# Patient Record
Sex: MALE | Race: WHITE
[De-identification: names, ages, dates, MRNs, and addresses within clinical notes are randomized per-mention and may not be internally consistent; named-entity substitution may affect disease eponyms.]

---

## 2018-01-26 ENCOUNTER — HOSPITAL ENCOUNTER (OUTPATIENT)
Dept: HOSPITAL 17 - CPRE | Age: 58
End: 2018-01-26
Attending: ORTHOPAEDIC SURGERY
Payer: COMMERCIAL

## 2018-01-26 DIAGNOSIS — Z01.812: Primary | ICD-10-CM

## 2018-01-26 DIAGNOSIS — M17.12: ICD-10-CM

## 2018-01-26 DIAGNOSIS — M79.609: ICD-10-CM

## 2018-01-26 LAB
BUN SERPL-MCNC: 14 MG/DL (ref 7–18)
CALCIUM SERPL-MCNC: 8.9 MG/DL (ref 8.5–10.1)
CHLORIDE SERPL-SCNC: 101 MEQ/L (ref 98–107)
COLOR UR: YELLOW
CREAT SERPL-MCNC: 0.93 MG/DL (ref 0.6–1.3)
ERYTHROCYTE [DISTWIDTH] IN BLOOD BY AUTOMATED COUNT: 12.9 % (ref 11.6–17.2)
GFR SERPLBLD BASED ON 1.73 SQ M-ARVRAT: 84 ML/MIN (ref 89–?)
GLUCOSE UR STRIP-MCNC: (no result) MG/DL
HCO3 BLD-SCNC: 28.1 MEQ/L (ref 21–32)
HCT VFR BLD CALC: 45.1 % (ref 39–51)
HGB BLD-MCNC: 15.2 GM/DL (ref 13–17)
HGB UR QL STRIP: (no result)
INR PPP: 1 RATIO
KETONES UR STRIP-MCNC: (no result) MG/DL
MCH RBC QN AUTO: 28.1 PG (ref 27–34)
MCHC RBC AUTO-ENTMCNC: 33.8 % (ref 32–36)
MCV RBC AUTO: 83.2 FL (ref 80–100)
MUCOUS THREADS #/AREA URNS LPF: (no result) /LPF
NITRITE UR QL STRIP: (no result)
PLATELET # BLD: 260 TH/MM3 (ref 150–450)
PMV BLD AUTO: 8.1 FL (ref 7–11)
PROTHROMBIN TIME: 9.8 SEC (ref 9.8–11.6)
RBC # BLD AUTO: 5.42 MIL/MM3 (ref 4.5–5.9)
SODIUM SERPL-SCNC: 136 MEQ/L (ref 136–145)
SP GR UR STRIP: 1.02 (ref 1–1.03)
SQUAMOUS #/AREA URNS HPF: 1 /HPF (ref 0–5)
URINE LEUKOCYTE ESTERASE: (no result)
WBC # BLD AUTO: 8.7 TH/MM3 (ref 4–11)

## 2018-01-26 PROCEDURE — 85730 THROMBOPLASTIN TIME PARTIAL: CPT

## 2018-01-26 PROCEDURE — 85027 COMPLETE CBC AUTOMATED: CPT

## 2018-01-26 PROCEDURE — 80048 BASIC METABOLIC PNL TOTAL CA: CPT

## 2018-01-26 PROCEDURE — 85610 PROTHROMBIN TIME: CPT

## 2018-01-26 PROCEDURE — 36415 COLL VENOUS BLD VENIPUNCTURE: CPT

## 2018-01-26 PROCEDURE — 81001 URINALYSIS AUTO W/SCOPE: CPT

## 2018-02-19 ENCOUNTER — HOSPITAL ENCOUNTER (INPATIENT)
Dept: HOSPITAL 17 - HSDI | Age: 58
LOS: 1 days | Discharge: HOME HEALTH | DRG: 470 | End: 2018-02-20
Attending: ORTHOPAEDIC SURGERY | Admitting: ORTHOPAEDIC SURGERY
Payer: COMMERCIAL

## 2018-02-19 VITALS — OXYGEN SATURATION: 96 %

## 2018-02-19 VITALS — HEART RATE: 84 BPM

## 2018-02-19 VITALS
RESPIRATION RATE: 17 BRPM | TEMPERATURE: 96.9 F | SYSTOLIC BLOOD PRESSURE: 102 MMHG | DIASTOLIC BLOOD PRESSURE: 64 MMHG | HEART RATE: 79 BPM | OXYGEN SATURATION: 97 %

## 2018-02-19 VITALS
DIASTOLIC BLOOD PRESSURE: 76 MMHG | SYSTOLIC BLOOD PRESSURE: 143 MMHG | RESPIRATION RATE: 16 BRPM | HEART RATE: 97 BPM | OXYGEN SATURATION: 97 % | TEMPERATURE: 97.7 F

## 2018-02-19 VITALS — BODY MASS INDEX: 33.25 KG/M2 | HEIGHT: 68 IN | WEIGHT: 219.36 LBS

## 2018-02-19 DIAGNOSIS — M17.12: Primary | ICD-10-CM

## 2018-02-19 DIAGNOSIS — R94.39: ICD-10-CM

## 2018-02-19 PROCEDURE — 0SRD0JA REPLACEMENT OF LEFT KNEE JOINT WITH SYNTHETIC SUBSTITUTE, UNCEMENTED, OPEN APPROACH: ICD-10-PCS | Performed by: ORTHOPAEDIC SURGERY

## 2018-02-19 PROCEDURE — 85027 COMPLETE CBC AUTOMATED: CPT

## 2018-02-19 PROCEDURE — 71045 X-RAY EXAM CHEST 1 VIEW: CPT

## 2018-02-19 PROCEDURE — 83735 ASSAY OF MAGNESIUM: CPT

## 2018-02-19 PROCEDURE — C1776 JOINT DEVICE (IMPLANTABLE): HCPCS

## 2018-02-19 PROCEDURE — 94150 VITAL CAPACITY TEST: CPT

## 2018-02-19 PROCEDURE — 86850 RBC ANTIBODY SCREEN: CPT

## 2018-02-19 PROCEDURE — 3E0T3BZ INTRODUCTION OF ANESTHETIC AGENT INTO PERIPHERAL NERVES AND PLEXI, PERCUTANEOUS APPROACH: ICD-10-PCS

## 2018-02-19 PROCEDURE — 86901 BLOOD TYPING SEROLOGIC RH(D): CPT

## 2018-02-19 PROCEDURE — 86900 BLOOD TYPING SEROLOGIC ABO: CPT

## 2018-02-19 PROCEDURE — 73560 X-RAY EXAM OF KNEE 1 OR 2: CPT

## 2018-02-19 PROCEDURE — 80048 BASIC METABOLIC PNL TOTAL CA: CPT

## 2018-02-19 PROCEDURE — 36415 COLL VENOUS BLD VENIPUNCTURE: CPT

## 2018-02-19 RX ADMIN — ASPIRIN SCH MG: 81 TABLET ORAL at 20:16

## 2018-02-19 RX ADMIN — HYDROCODONE BITARTRATE AND ACETAMINOPHEN PRN TAB: 7.5; 325 TABLET ORAL at 18:15

## 2018-02-19 RX ADMIN — ASPIRIN SCH MG: 81 TABLET ORAL at 09:00

## 2018-02-19 RX ADMIN — CLONIDINE HYDROCHLORIDE SCH MG: 0.1 INJECTION, SOLUTION EPIDURAL at 18:14

## 2018-02-19 RX ADMIN — OXYTOCIN SCH MLS/HR: 10 INJECTION, SOLUTION INTRAMUSCULAR; INTRAVENOUS at 10:00

## 2018-02-19 RX ADMIN — ACYCLOVIR SCH TAB: 800 TABLET ORAL at 09:00

## 2018-02-19 RX ADMIN — OXYTOCIN SCH MLS/HR: 10 INJECTION, SOLUTION INTRAMUSCULAR; INTRAVENOUS at 19:04

## 2018-02-19 NOTE — RADRPT
EXAM DATE/TIME:  02/19/2018 10:01 

 

[facility] COMPARISON:     

No previous studies available for comparison.

 

                     

INDICATIONS :     

Post-op left knee replacement.

                     

 

MEDICAL HISTORY :     

None.          

 

SURGICAL HISTORY :     

Total knee replacement, left.   

 

ENCOUNTER:     

Initial                                        

 

ACUITY:     

1 day      

 

PAIN SCORE:     

0/10

LOCATION:     Left  Knee

 

FINDINGS:     

Postsurgical features of left knee arthroplasty. Arthroplasty components are in anatomic alignment. N
o significant acute bony fracture. Immediate postsurgical soft tissue features.

 

CONCLUSION:     

1. Status post left knee arthroplasty in anatomic alignment without significant acute bony fracture.

 

 

 Gopal Armstrong MD on February 19, 2018 at 11:08           

Board Certified Radiologist.

 This report was verified electronically.

## 2018-02-19 NOTE — PD.OP
__________________________________________________





Operative Report


Date of Surgery:  Feb 19, 2018


Preoperative Diagnosis:  


(1) Primary osteoarthritis of left knee


Postoperative Diagnosis:  


(1) Primary osteoarthritis of left knee


Procedure:


Left total knee arthroplasty with [place] Triathlon prosthesis (uncemented)


Anesthesia:


Spinal with supplemental adductor canal block regional and local with Exparel


Surgeon:


Mikael Gutierrez M.D.


Assistant(s):


ERNESTINA Colindres


Operation and Findings:


Indications and Findings: This 57-year-old man has had progressive worsening of 

left knee pain subsequent to a left knee injury while working.  He began with 

arthroscopic surgery and has had progressive worsening of his pain with 

progressive loss of articular cartilage on x-ray and progressive bowing of his 

knee.  His pain has worsened when he had to discontinue nonsteroidal anti-

inflammatory agents because of hepatic and renal issues.  Physical findings 

have shown significant varus with laxity in the medial compartment and medial 

tenderness.  X-rays have shown loss of articular cartilage to bone on bone in 

the medial compartment particularly when flexed with osteophytes and eburnation.





Operative findings: There was significant arthritis particularly in the medial 

compartment but also patellofemoral to a lesser extent lateral.  This went to 

bone on bone with eburnation and osteophytes medially.





The prosthesis used was a [place] Triathlon prosthesis.


The femur was a size 5 uncemented cruciate retaining. The tibial baseplate was 

a size 5 Tritanium with a 9 mm cruciate retaining X3 polyethylene spacer. The 

patella was a size 35 mm asymmetric Tritanium backed. 





The patient was brought to the clean-air operating suite after an adductor 

canal block regional had been performed.  A spinal anesthetic was administered.

  The position was supine with a small bolster under the hip on the operative 

side.  A pneumatic tourniquet was applied to the upper thigh.  The lower 

extremity was then prepped with alcohol, Hibiclens and ChloraPrep and draped in 

the usual manner with the knee draped free.  An appropriate timeout procedure 

was carried out.





An incision was made from about 3 fingerbreadths above the superior medial pole 

of patella down the tibial tubercle on the medial side.  The incision was 

deepened through the subcutaneous tissue to the retinacular structures which 

were exposed medially and laterally.  A medial retinacular incision was then 

made from the superior middle pole of patella down the tibial tubercle and up 

into the quadriceps tendon splitting it longitudinally and the medial one 

third.  The patella was reflected.  The infrapatellar fat pad was debulked.  

The anterior cruciate ligament was excised.  Medial and lateral meniscectomies 

were initiated.  A fenestration was made in the distal femur for the 

intramedullary referencing guide.  





The distal femoral cutting guide and jig were then assembled for a 5, 8 mm 

cut.  When this was in position, the cutting block was stabilized with pins.  

The jig was removed.  The distal femoral cut was then completed with the 

oscillating saw.  The sizing guide was then positioned in place along 

Whitesides line and the epicondylar axis and stabilized with pins.  The femoral 

size was then determined with the sizing guide.  The 4-in-1 cutting block was 

then positioned in place.  Anterior and posterior cuts were made followed by 

posterior and anterior chamfer cuts taking care to prevent injury to 

ligamentous structures.  Osteophytes were then trimmed from the distal femur.  

A bone plug was then placed into the fenestration of the distal femur.  The 

proximal tibia was then exposed.  The medial and lateral meniscectomies were 

completed.  The extramedullary proximal tibial cutting guide was then 

positioned in place and stabilized for rotation.  The depth of cut was then 

verified with a stylus referencing from the predetermined side.  The cutting 

block was stabilized with pins.  The jig was removed.  The depth of cut was 

then verified and adjusted appropriately with the use of the spacer block.  The 

proximal tibial cut was then made with the oscillating saw taking care to 

prevent injury to neurovascular and ligamentous structures.  Proximal tibial 

bone was removed.  Local anesthetic was administered with Exparel in the 

posterior capsule.  The tibial baseplate trial was then positioned in place.  

After verifying the appropriate size, the base plate trial was positioned in 

place along with its spacer.  The trial femoral component was then impacted 

into place.  The alignment was checked.  The trial tibial baseplate was then 

pinned in place on the tibia.  Attention was directed to the patella.  The 

patella drill guide was positioned in place for the appropriate sized patella.  

Patellar drilling was then carried out.  The trial patella was positioned in 

place.  The knee was taken through a range of motion which was easily 0 

extension to 145.





The patella trial was removed.  The femoral drill holes were made.  The femoral 

trial prosthesis was removed.  The tibial spacer was removed.  A bone plug was 

placed into the proximal tibia.  The tibial punch was impacted through the 

proximal tibial punch guide.  This was all removed followed by placement of the 

tibial drill guide.  The tibial drill holes were then made.  The guide was 

removed.  The cut ends of bone were then cleaned with pulse lavage.  The tibial 

baseplate was then impacted into place and seated appropriately.  The spacer 

was inserted.  The the femoral component was then impacted into place and 

seated appropriately.  The patella component was then seated with the patellar 

vice and tightened appropriately.  The knee was taken through a range of motion 

which was comparable to the previous range of motion with excellent stability 

in flexion and extension and appropriate patellofemoral tracking.  The 

remainder of the Exparel was then injected throughout the knee as a local 

anesthetic.  Drains were brought out the superior lateral aspect of the 

suprapatellar pouch.  Wound closure then commenced using 0 Vicryl interrupted 

figure-of-eight sutures for the capsular and fascial structures, 2-0 Vicryl 

interrupted simple sutures with buried knots for the subcutaneous tissues and 4-

0 Monocryl, tenuous subcuticular closure for the skin.  The wound was then 

dressed with Steri-Strips followed by Optifoam silver impregnated dressing.  

Sterile soft roll with a cooling pad and Ace bandage from the base of the toes 

to mid thigh were then applied.  Patient was then transferred from the 

operating room to the recovery room in satisfactory condition having tolerated 

procedure well.





Counts are correct.





Specimens: None.





Estimated blood loss: 100 mL











YADIRA Gutierrez MD ([facility]) Feb 19, 2018 09:07
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
## 2018-02-19 NOTE — HHI.FF
Face to Face Verification


Diagnosis:  


(1) Status post total left knee replacement


Physical Therapy


Gait training


Knee:  Total knee, Protocol: Left, Gait training, Full weight bearing


Left LE Weight Bearing:  WB as tolerated


Left LE Range of Motion:  Active ROM (active, active-assisted, passive range of 

motion.  Range of motion goal is 0 extension to 135 of flexion.  Range of 

motion in the operating room was 0 extension to 145 of flexion.)





Nursing


Nursing:  Dressing changes (to begin on postop day 7)


Dressing Changes:  Daily dressing change ( To begin on postop day 7.), Coverderm

/Primapore


Additional Instructions


Steri-Strips are to be removed on postop day 14.











I have seen patient Fransisco Pulido on 2/19/18. My clinical findings 

support the need for the requested home health care services because:








 Ltd mobility - disease progression





 Limited ability to care for self





 High risk of falls














I certify that my clinical findings support that this patient is homebound 

because:








 Post-op weakness





 Unsteady gait/balance





 Unsafe to leave home unassisted

















YADIRA Gutierrez MD ([facility]) Feb 19, 2018 06:40
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
## 2018-02-19 NOTE — HHI.PR
__________________________________________________





Immediate Post Op Note


Procedure Date:


Feb 19, 2018


Pre Op Diagnosis:  


(1) Primary osteoarthritis of left knee


Post Op Diagnosis:  


(1) Primary osteoarthritis of left knee


Surgeon:


Mikael Gutierrez M.D.


Assistant(s):


ERNESTINA Colindres


Procedure:


Left total knee arthroplasty with [place] Triathlon prosthesis (uncemented)


Findings:


There was severe osteoarthritis in the right knee with loss of articular 

cartilage to bone on bone, eburnation and osteophytes.


Complications:


None


Specimen(s) removed:


None


Estimated blood loss:


100 mL


Anesthesia:  General, Regional Block (adductor canal block), Local (Exparel)


Drains:  Hemovac (to)


IVF


Tourniquet time (min at mmHg)


None


Patient to:  PACU


Patient Condition:  Good


Implant/Devices:  SEE IMPLANT LOG (if applicable)


Date/Time of Procedure:  SEE SURGICAL CARE RECORD











YADIRA Gutierrez MD ([place]) Feb 19, 2018 09:02

## 2018-02-20 VITALS
SYSTOLIC BLOOD PRESSURE: 119 MMHG | OXYGEN SATURATION: 98 % | DIASTOLIC BLOOD PRESSURE: 79 MMHG | HEART RATE: 99 BPM | RESPIRATION RATE: 17 BRPM | TEMPERATURE: 98.7 F

## 2018-02-20 VITALS
RESPIRATION RATE: 17 BRPM | OXYGEN SATURATION: 97 % | DIASTOLIC BLOOD PRESSURE: 68 MMHG | HEART RATE: 75 BPM | SYSTOLIC BLOOD PRESSURE: 121 MMHG | TEMPERATURE: 97.2 F

## 2018-02-20 VITALS
DIASTOLIC BLOOD PRESSURE: 70 MMHG | OXYGEN SATURATION: 98 % | RESPIRATION RATE: 16 BRPM | TEMPERATURE: 97 F | HEART RATE: 78 BPM | SYSTOLIC BLOOD PRESSURE: 126 MMHG

## 2018-02-20 VITALS — OXYGEN SATURATION: 100 %

## 2018-02-20 VITALS
DIASTOLIC BLOOD PRESSURE: 81 MMHG | RESPIRATION RATE: 17 BRPM | HEART RATE: 66 BPM | SYSTOLIC BLOOD PRESSURE: 118 MMHG | TEMPERATURE: 97.1 F | OXYGEN SATURATION: 98 %

## 2018-02-20 LAB
BUN SERPL-MCNC: 19 MG/DL (ref 7–18)
CALCIUM SERPL-MCNC: 8.7 MG/DL (ref 8.5–10.1)
CHLORIDE SERPL-SCNC: 103 MEQ/L (ref 98–107)
CREAT SERPL-MCNC: 0.76 MG/DL (ref 0.6–1.3)
ERYTHROCYTE [DISTWIDTH] IN BLOOD BY AUTOMATED COUNT: 12.9 % (ref 11.6–17.2)
GFR SERPLBLD BASED ON 1.73 SQ M-ARVRAT: 106 ML/MIN (ref 89–?)
GLUCOSE SERPL-MCNC: 112 MG/DL (ref 74–106)
HCO3 BLD-SCNC: 26.3 MEQ/L (ref 21–32)
HCT VFR BLD CALC: 41.6 % (ref 39–51)
HGB BLD-MCNC: 14.2 GM/DL (ref 13–17)
MAGNESIUM SERPL-MCNC: 2.2 MG/DL (ref 1.5–2.5)
MCH RBC QN AUTO: 28.2 PG (ref 27–34)
MCHC RBC AUTO-ENTMCNC: 34.1 % (ref 32–36)
MCV RBC AUTO: 82.5 FL (ref 80–100)
PLATELET # BLD: 167 TH/MM3 (ref 150–450)
PMV BLD AUTO: 8.2 FL (ref 7–11)
RBC # BLD AUTO: 5.04 MIL/MM3 (ref 4.5–5.9)
SODIUM SERPL-SCNC: 138 MEQ/L (ref 136–145)
WBC # BLD AUTO: 14.3 TH/MM3 (ref 4–11)

## 2018-02-20 RX ADMIN — OXYTOCIN SCH MLS/HR: 10 INJECTION, SOLUTION INTRAMUSCULAR; INTRAVENOUS at 07:34

## 2018-02-20 RX ADMIN — CLONIDINE HYDROCHLORIDE SCH MG: 0.1 INJECTION, SOLUTION EPIDURAL at 12:19

## 2018-02-20 RX ADMIN — ASPIRIN SCH MG: 81 TABLET ORAL at 09:40

## 2018-02-20 RX ADMIN — CLONIDINE HYDROCHLORIDE SCH MG: 0.1 INJECTION, SOLUTION EPIDURAL at 05:46

## 2018-02-20 RX ADMIN — CLONIDINE HYDROCHLORIDE SCH MG: 0.1 INJECTION, SOLUTION EPIDURAL at 00:59

## 2018-02-20 RX ADMIN — HYDROCODONE BITARTRATE AND ACETAMINOPHEN PRN TAB: 7.5; 325 TABLET ORAL at 12:20

## 2018-02-20 RX ADMIN — ACYCLOVIR SCH TAB: 800 TABLET ORAL at 09:40

## 2018-02-20 NOTE — HHI.DS
Discharge Summary


Admission Date


Feb 19, 2018 at 05:17


Discharge Date:  Feb 20, 2018


Admitting Diagnosis


Primary osteoarthritis, left knee.


Diagnosis:  


(1) Status post total left knee replacement


Diagnosis:  Principal


ICD Codes:  Z96.652 - Presence of left artificial knee joint


(2) Primary osteoarthritis of left knee


Diagnosis:  Principal


ICD Codes:  M17.12 - Unilateral primary osteoarthritis, left knee


Status:  Resolved


Procedures


Left total knee arthroplasty using [place] Triathlon prosthesis (uncemented) on 

2/19/2018


Brief History


This is a 57 year old male patient has had long-standing pain in his left knee 

with progressive varus deformity, progressive pain in the medial aspect which 

has been nonresponsive to conservative measures as detailed in the history and 

physical examination.  Physical findings showed degenerative varum with 

crepitation and palpable osteophytes.  X-rays showed severe osteoarthritis in 

the medial compartment down to bone on bone with osteophytes and eburnation.


CBC/BMP:  


2/20/18 0435                                                                   

             2/20/18 0435





Significant Findings





Laboratory Tests








Test


  2/20/18


04:35


 


White Blood Count


  14.3 TH/MM3


(4.0-11.0)


 


Blood Urea Nitrogen


  19 MG/DL


(7-18)


 


Random Glucose


  112 MG/DL


()








PE at Discharge


He is resting comfortably, supine in bed, in the [facility].


The neurovascular status is intact.


The dressing is dry and intact.


Hospital Course


The patient was admitted as noted above.  The above noted operative procedure 

was carried out that day.  Preoperatively prophylactic antibiotics were 

administered Ancef according to protocol.  These were continued 

postoperatively.  The patient also received tranexamic acid to help with 

hemostasis according to protocol.  In the postanesthesia care unit a continuous 

passive motion device was initiated.  Also initiated were mechanical methods of 

DVT prophylaxis in the form of MICHELLE stockings and sequentials.  Physical therapy 

was initiated on the day of surgery.  He was able to walk 150 feet the day of 

surgery.   


On postoperative day #1 physical therapy continued.  The use of the continuous 

passive motion device continued.  DVT prophylaxis with aspirin 81 mg twice 

daily was initiated at this time.  The patient continued physical therapy 

throughout the hospitalization.  The distance walked and range of motion 

improved throughout the hospitalization.


The patient was discharged on postoperative day 1 with the disposition being to 

home with home health care.  An appointment for follow-up was made prior to 

admission.


Pt Condition on Discharge:  Good


Discharge Disposition:  Disch w/ Home Health Serv


Discharge Instructions


Diet Instructions:  As Tolerated, No Restrictions


Activities You Can Perform:  Full Weight Bearing, Shower Only-No Bath


Activities to Avoid:  Lifting/Bending, Strenuous Activity, Bathing, Driving


Follow up Referrals:  


Orthopedics with YADIRA Gutierrez MD ([facility])





New Medications:  


Aspirin  (Aspirin DR) 81 Mg Tabdr


81 MG PO BID for Prevent Blood Clot for 30 Days, #60 TAB





Hydrocodone/Acetaminophen (Hydrocodone-Acetamin 7.5-325) 7.5 Mg-325 Mg Tablet


1 TAB PO Q4H PRN for PAIN SCALE 1 TO 10, #50 TAB





 


Continued Medications:  


Diclofenac-Misoprostol (Diclofenac-Misoprostol) 75-0.2 Mg Tab


1 TAB PO DAILY for Pain Management, #60 TAB 0 Refills





Esomeprazole  (Nexium) 20 Mg Capdr


20 MG PO EVERY OTHER DAY, CAP 0 Refills





Gabapentin (Gabapentin) 300 Mg Cap


300 MG PO HS, #30 CAP 0 Refills





Multiple Vitamin (Multi-Vitamin Daily) 1 Tab Tab


1 TAB PO DAILY for Nutritional Supplement, TAB 0 Refills





Ranitidine (Zantac) 150 Mg Tab


150 MG PO EVERY OTHER DAY for Reduce Stomach Acid, #30 TAB 0 Refills





 


Discontinued Medications:  


Aspirin  (Aspirin DR) 81 Mg Tabdr


81 MG PO DAILY, TAB 0 Refills

















YADIRA Gutierrez MD ([place]) Feb 20, 2018 07:00

## 2018-02-20 NOTE — PD.ORT.PN
Subjective


Post Op Day #:  1


Subjective Remarks


He is doing well.  He has minimal complaints related to the knee.  He indicates 

that he is not short of breath.  He wonders what his chest x-ray yesterday.


Range of Motion


-5 degrees extension to 90 of flexion.


Distance Walked


150 feet with PT.





Objective


Vitals





Vital Signs








  Date Time  Temp Pulse Resp B/P (MAP) Pulse Ox O2 Delivery O2 Flow Rate FiO2


 


2/20/18 03:40 97.0 78 16 126/70 (88) 98   


 


2/20/18 00:40 97.2 75 17 121/68 (85) 97   


 


2/19/18 21:30 96.9 79 17 102/64 (77) 97   


 


2/19/18 19:30   18     


 


2/19/18 19:30   18     


 


2/19/18 16:01     96   


 


2/19/18 16:00 97.7 97 16 143/76 (98) 97   


 


2/19/18 15:55   18     


 


2/19/18 14:15  72 20 129/64 (85) 95 Room Air  


 


2/19/18 13:30  72 20 153/84 (107) 95 Nasal Cannula  


 


2/19/18 12:30  68 20 114/79 (91) 95 Nasal Cannula  


 


2/19/18 11:30  68 20 126/83 (97) 95 Nasal Cannula  


 


2/19/18 10:30  68 20 121/74 (90) 98 Nasal Cannula  


 


2/19/18 10:15  72 20 123/74 (90) 96 Nasal Cannula  


 


2/19/18 10:00  69 20 113/68 (83) 95 Nasal Cannula  


 


2/19/18 09:45  66 20 108/68 (81) 94   


 


2/19/18 09:24 98.2 71 20 95/51 (66) 97 Nasal Cannula 2 


 


2/19/18 06:11  84      


 


2/19/18 05:57 98.9 85 20 139/91 (107) 98   














I/O      


 


 2/19/18 2/19/18 2/19/18 2/20/18 2/20/18 2/20/18





 07:00 15:00 23:00 07:00 15:00 23:00


 


Intake Total  1500 ml 100 ml 1060 ml  


 


Output Total  100 ml 130 ml 750 ml  


 


Balance  1400 ml -30 ml 310 ml  


 


      


 


Intake Oral    960 ml  


 


IV Total   100 ml 100 ml  


 


Other  1500 ml    


 


Output Urine Total    700 ml  


 


Drainage Total   130 ml 50 ml  


 


Estimated Blood Loss  100 ml    


 


# Bowel Movements    0  








Imaging





Last 24 hours Impressions








Knee X-Ray 2/19/18 0634 Signed





Impressions: 





 Service Date/Time:  Monday, February 19, 2018 10:01 - CONCLUSION:  1. Status 





 post left knee arthroplasty in anatomic alignment without significant acute 

bony 





 fracture.    Gopal Armstrong MD 








Objective Remarks


He is resting comfortably, supine in bed, in the CPM.


The neurovascular status is intact.


The dressing is dry and intact.





Assessment & Plan


Ortho Post Op Day #:  1


Problem List:  


(1) Status post total left knee replacement


ICD Codes:  Z96.652 - Presence of left artificial knee joint


Plan:  Continue postop care and PT.





Assessment and Plan


Condition: Good.


Orthopedically stable.


DVT prophylaxis: Sequentials, MICHELLE stockings, aspirin


Discharge plans: Home with home health care.


An appointment was scheduled through the office.


Prescriptions: Norco 7.5/325


[]











YADIRA Gutierrez MD (Charles) Feb 20, 2018 05:51

## 2018-02-20 NOTE — HHI.PR
Subjective


Remarks


in no acute distress.


pain is controlled.


no new complaints.





Objective


Vitals





Vital Signs








  Date Time  Temp Pulse Resp B/P (MAP) Pulse Ox O2 Delivery O2 Flow Rate FiO2


 


2/20/18 08:00 97.1 66 17 118/81 (93) 98   


 


2/20/18 03:40 97.0 78 16 126/70 (88) 98   


 


2/20/18 00:40 97.2 75 17 121/68 (85) 97   


 


2/19/18 21:30 96.9 79 17 102/64 (77) 97   


 


2/19/18 19:30   18     


 


2/19/18 19:30   18     


 


2/19/18 16:01     96   


 


2/19/18 16:00 97.7 97 16 143/76 (98) 97   


 


2/19/18 15:55   18     


 


2/19/18 14:15  72 20 129/64 (85) 95 Room Air  


 


2/19/18 13:30  72 20 153/84 (107) 95 Nasal Cannula  


 


2/19/18 12:30  68 20 114/79 (91) 95 Nasal Cannula  


 


2/19/18 11:30  68 20 126/83 (97) 95 Nasal Cannula  














I/O      


 


 2/19/18 2/19/18 2/19/18 2/20/18 2/20/18 2/20/18





 07:00 15:00 23:00 07:00 15:00 23:00


 


Intake Total  1500 ml 100 ml 1740 ml  


 


Output Total  100 ml 130 ml 1750 ml 50 ml 


 


Balance  1400 ml -30 ml -10 ml -50 ml 


 


      


 


Intake Oral    1640 ml  


 


IV Total   100 ml 100 ml  


 


Other  1500 ml    


 


Output Urine Total    1700 ml  


 


Drainage Total   130 ml 50 ml 50 ml 


 


Estimated Blood Loss  100 ml    


 


# Bowel Movements    0  








Result Diagram:  


2/20/18 0435                                                                   

             2/20/18 0435





Imaging





Last Impressions








Knee X-Ray 2/19/18 0634 Signed





Impressions: 





 Service Date/Time:  Monday, February 19, 2018 10:01 - CONCLUSION:  1. Status 





 post left knee arthroplasty in anatomic alignment without significant acute 

bony 





 fracture.    Gopal Armstrong MD 


 


Chest X-Ray 2/19/18 0000 Signed





Impressions: 





 Service Date/Time:  Monday, February 19, 2018 15:59 - CONCLUSION: The lungs 

are 





 clear.     José Miguel Brooks MD 








Objective Remarks


GENERAL: This is a well-nourished, well-developed patient, in no apparent 

distress.


CARDIOVASCULAR: Regular rate and regular rhythm without murmurs, gallops, or 

rubs. 


RESPIRATORY: Clear to auscultation. Breath sounds equal bilaterally. No wheezes

, rales, or rhonchi.  


GASTROINTESTINAL: Abdomen soft, non-tender, nondistended. 


Normal, active bowel sounds


MUSCULOSKELETAL: left knee/leg covered with clean dressing.


NEURO:  Alert & Oriented x4 to person, place, time, situation.  Moves all ext x4


Medications and IVs





Inpatient Medications


Acetaminophen/ Hydrocodone Bitart (Norco  7.5-325 Mg) 2 tab Q4H  PRN PO PAIN 

SCALE 5 TO 10 Last administered on 2/19/18at 14:41;  Start 2/19/18 at 06:45


Albuterol/ Ipratropium (Duoneb Neb) 1 ampule Q2HR NEB  PRN NEB wheezing;  Start 

2/19/18 at 17:00


Aspirin (Ecotrin Ec) 81 mg BID PO  Last administered on 2/20/18at 09:40;  Start 

2/19/18 at 09:00


Bupivacaine Liposome 20 ml/ Sodium Chloride 100 ml @  200 mls/hr ONCE P-

ARTICULR  Last administered on 2/19/18at 07:54;  Start 2/19/18 at 07:15;  Stop 2 /20/18 at 07:14;  Status DC


Cefazolin Sodium 1000 mg/Sodium Chloride 100 ml @  200 mls/hr Q6H IV  Last 

administered on 2/20/18at 00:59;  Start 2/19/18 at 14:00;  Stop 2/20/18 at 02:29

;  Status DC


Cefazolin Sodium/ Dextrose 50 ml @  100 mls/hr ON  CALL IV  Last administered 

on 2/19/18at 07:53;  Start 2/19/18 at 05:45;  Stop 2/22/18 at 05:44


Chlorhexidine Gluconate (Chlorhexidine 2% Cloth) 3 pack ON CALL  PRN TOPICAL 

SEE LABEL COMMENTS Last administered on 2/19/18at 05:30;  Start 2/19/18 at 05:45

;  Stop 2/22/18 at 05:44


Chlorhexidine Gluconate (Hibiclens 4% Top Soln) 1 applic ONCE TOPICAL  Last 

administered on 2/19/18at 06:00;  Start 2/19/18 at 05:45;  Stop 2/22/18 at 05:44


Docusate Sodium (Colace) 100 mg BID PO ;  Start 2/20/18 at 21:00


Famotidine (Pepcid) 150 mg EVERY OTHER  DAY PO ;  Start 2/20/18 at 09:00


Gabapentin (Neurontin) 300 mg HS PO ;  Start 2/19/18 at 21:00


Ketorolac Tromethamine (Toradol Inj) 15 mg Q6H IVP  Last administered on 2/20/ 18at 05:46;  Start 2/19/18 at 18:00;  Stop 2/21/18 at 12:01


Lactated Ringer's 1,000 ml @  80 mls/hr [id number] IV  Last administered on 2/19/ 18at 10:00;  Start 2/19/18 at 06:34


Magnesium Hydroxide (Milk Of Magnesia Liq) 30 ml DAILY  PRN PO CONSTIPATION 

Last administered on 2/20/18at 09:40;  Start 2/19/18 at 06:45


Metoprolol Tartrate (Lopressor) 25 mg ON CALL  PRN PO SEE LABEL COMMENTS;  

Start 2/19/18 at 05:45;  Stop 2/22/18 at 05:44


Miscellaneous Information ALL NURSING DEPARTME... UNSCH  PRN .XX SEE LABEL 

COMMENTS;  Start 2/19/18 at 10:45;  Stop 2/20/18 at 10:44;  Status DC


Miscellaneous Information (Post-op Orders (for Pharmacy))  STAT  ONCE XX ;  

Start 2/19/18 at 06:45;  Stop 2/19/18 at 12:37;  Status DC


Morphine Sulfate (Morphine Inj) 4 mg Q3H  PRN IV PUSH BREAKTHROUGH PAIN;  Start 

2/19/18 at 06:45


Multivitamins (Theragran) 1 tab DAILY PO  Last administered on 2/20/18at 09:40;

  Start 2/19/18 at 09:00


Ondansetron HCl (Zofran Inj) 4 mg Q6H  PRN IVP NAUSEA OR VOMITING;  Start 2/19/ 18 at 06:45


Pantoprazole Sodium (Protonix) 20 mg EVERY OTHER  DAY PO ;  Start 2/19/18 at 09:

00


Povidone Iodine (Betadine 5% Antisepsis Kit) 1 applic ON CALL  PRN EACH NARE 

SEE LABEL COMMENTS Last administered on 2/19/18at 06:00;  Start 2/19/18 at 05:45

;  Stop 2/22/18 at 05:44


Sodium Chloride 500 ml @  30 mls/hr [id number] PRN IV SEE LABEL COMMENTS;  Start 2/ 19/18 at 05:45;  Stop 2/22/18 at 05:44


Tranexamic Acid 995 mg/Sodium Chloride 109.95 ml  @ 200 mls/ hr ONCE IV  Last 

administered on 2/19/18at 10:41;  Start 2/19/18 at 10:00;  Stop 2/19/18 at 12:30

;  Status DC


Zolpidem Tartrate (Ambien) 5 mg HS  PRN PO SLEEP;  Start 2/19/18 at 06:45





A/P


Assessment and Plan


Left knee replacement


The patient had surgical repair on 02/19/18.  He tolerated the procedure well.


- Weightbearing, wound care and anticoagulation per orthopedic surgery.


- Physical therapy.


- Incentive spirometry.


- Pain control with a bowel regimen.





Abnormal stress test


The patient said he had an abnormal stress test prior to the surgery so was 

referred for catheterization.  Catheterization was negative for obstructive 

coronary disease.


- Outpatient follow-up.


- Continue aspirin once cleared by orthopedic surgery.





PPx: Per surgery


Discharge Planning


dc planning per ortho.











Tia Greer MD Feb 20, 2018 11:08